# Patient Record
(demographics unavailable — no encounter records)

---

## 2021-08-06 NOTE — EDM.PDOC
ED HPI GENERAL MEDICAL PROBLEM





- General


Chief Complaint: General


Stated Complaint: PAIN RT SIDE OF FACE ON/ABOVE CHEECK BONE


Time Seen by Provider: 08/06/21 08:48


Source of Information: Reports: Patient


History Limitations: Reports: No Limitations





- History of Present Illness


INITIAL COMMENTS - FREE TEXT/NARRATIVE: 





30F PMHx suboxone use presents for facial pain. Patient has noted 4 days of 

sinus congestion with green/yellow mucous from R nare.  She denies any shortness

of breath, cough, fevers.  She has noticed worsening facial pain in her frontal 

right maxillary face.  It was initially a pressure sensation and now progressing

to pain.  She notes is worse with palpation.  She denies sore throat.  She does 

note some pain when she is chewing in her right maxillary face.


  ** Right Face/Facial


Pain Score (Numeric/FACES): 4





- Related Data


                                    Allergies











Allergy/AdvReac Type Severity Reaction Status Date / Time


 


iodine Allergy  Swelling Verified 08/06/21 09:00











Home Meds: 


                                    Home Meds





Buprenorphine HCl/Naloxone HCl [Suboxone 4 mg-1 mg Sl Film] 16 mg PO DAILY 

12/21/19 [History]


Amoxicillin/Potassium Clav [Augmentin 875-125 Tablet] 1 each PO BID 10 Days #20 

tablet 08/06/21 [Rx]











Past Medical History


Neurological History: Reports: Other (See Below)


Other Neuro History: epilepsy


Psychiatric History: Reports: Other (See Below)


Other Psychiatric History: opiod addiction





- Infectious Disease History


Infectious Disease History: Reports: Chicken Pox, Hepatitis A, Hepatitis B





- Past Surgical History


HEENT Surgical History: Reports: Naso-Sinus Surgery


Female  Surgical History: Reports: D&C





Social & Family History





- Family History


Family Medical History: No Pertinent Family History





- Caffeine Use


Caffeine Use: Reports: Coffee





ED ROS GENERAL





- Review of Systems


Review Of Systems: Comprehensive ROS is negative, except as noted in HPI.





ED EXAM, GENERAL





- Physical Exam


Exam: See Below


Exam Limited By: No Limitations


General Appearance: Alert, WD/WN, No Apparent Distress


Ears: Hearing Grossly Normal


Nose: Normal Inspection


Throat/Mouth: Normal Inspection, Normal Lips, Normal Teeth, Normal Oropharynx, 

Normal Voice, No Airway Compromise


Head: Atraumatic, Normocephalic, Other (R maxillary sinus TTP)


Neck: Normal Inspection, Supple, Full Range of Motion


Respiratory/Chest: No Respiratory Distress, Lungs Clear, Normal Breath Sounds, 

No Accessory Muscle Use


Cardiovascular: Normal Peripheral Pulses, Regular Rate, Rhythm


Extremities: Normal Inspection


Neurological: Alert, Normal Cognition, Normal Gait


Psychiatric: Normal Affect, Normal Mood


Skin Exam: Warm, Dry, Intact, Normal Color





Course





- Vital Signs


Last Recorded V/S: 


                                Last Vital Signs











Temp  98 F   08/06/21 09:01


 


Pulse  80   08/06/21 09:01


 


Resp  16   08/06/21 09:01


 


BP  118/74   08/06/21 09:01


 


Pulse Ox  97   08/06/21 09:01














- Re-Assessments/Exams


Free Text/Narrative Re-Assessment/Exam: 





08/06/21 09:05


Patient symptoms are suggestive of acute sinusitis.  Will treat with Augmentin. 

 Recommend reassessment by PMD.  Recommend return to ED if symptoms are not 

improving after 2 days of antibiotics.  Patient understands return precautions.





Departure





- Departure


Time of Disposition: 09:06


Disposition: Home, Self-Care 01


Condition: Good


Clinical Impression: 


Sinusitis


Qualifiers:


 Sinusitis location: maxillary Chronicity: acute Recurrence: non-recurrent 

Qualified Code(s): J01.00 - Acute maxillary sinusitis, unspecified








- Discharge Information


Prescriptions: 


Amoxicillin/Potassium Clav [Augmentin 875-125 Tablet] 1 each PO BID 10 Days #20 

tablet


Instructions:  Sinusitis, Adult, COVID-19 Vaccine Information


Referrals: 


PCP,None [Primary Care Provider] - 


Forms:  ED Department Discharge


Additional Instructions: 


The best way to protect yourself and others from COVID-19 is to take one of the 

three safe and effective vaccines that have been proven to substantially reduce 

risk of both infection and severe illness.





[facility] is currently offering COVID 

vaccinations for anyone age 18 and older. To schedule an appointment call 

[phone].


Or to be contacted by our clinics about scheduling your vaccine online, please 

go to [email or web address]





The following information is given to patients seen in the emergency department 

who are being discharged to home. This information is to outline your options 

for follow-up care. We provide all patients seen in our emergency department 

with a follow-up referral.





The need for follow-up, as well as the timing and circumstances, are variable 

depending upon the specifics of your emergency department visit.





If you don't have a primary care physician on staff, we will provide you with a 

referral. We always advise you to contact your personal physician following an 

emergency department visit to inform them of the circumstance of the visit and 

for follow-up with them and/or the need for any referrals to a consulting 

specialist.





The emergency department will also refer you to a specialist when appropriate. 

This referral assures that you have the opportunity for follow-up care with a 

specialist. All of these measure are taken in an effort to provide you with 

optimal care, which includes your follow-up.





Under all circumstances we always encourage you to contact your private 

physician who remains a resource for coordinating your care. When calling for 

follow-up care, please make the office aware that this follow-up is from your 

recent emergency room visit. If for any reason you are refused follow-up, please

contact the [facility] Emergency Department

at [phone] and asked to speak to the emergency department charge nurse.





Please follow up with your primary care physician. If you do not have a primary 

care physician, see below:


[facility]


[address] [place] [id number] [phone]





[facility]


[address] [place] [id number] [phone]





Sepsis Event Note (ED)





- Focused Exam


Vital Signs: 


                                   Vital Signs











  Temp Pulse Resp BP Pulse Ox


 


 08/06/21 09:01  98 F  80  16  118/74  97